# Patient Record
Sex: FEMALE | Race: WHITE | URBAN - METROPOLITAN AREA
[De-identification: names, ages, dates, MRNs, and addresses within clinical notes are randomized per-mention and may not be internally consistent; named-entity substitution may affect disease eponyms.]

---

## 2017-05-28 ENCOUNTER — APPOINTMENT (OUTPATIENT)
Dept: GENERAL RADIOLOGY | Facility: CLINIC | Age: 62
End: 2017-05-28
Attending: EMERGENCY MEDICINE
Payer: COMMERCIAL

## 2017-05-28 ENCOUNTER — APPOINTMENT (OUTPATIENT)
Dept: CT IMAGING | Facility: CLINIC | Age: 62
End: 2017-05-28
Attending: EMERGENCY MEDICINE
Payer: COMMERCIAL

## 2017-05-28 ENCOUNTER — TRANSFERRED RECORDS (OUTPATIENT)
Dept: HEALTH INFORMATION MANAGEMENT | Facility: CLINIC | Age: 62
End: 2017-05-28

## 2017-05-28 ENCOUNTER — HOSPITAL ENCOUNTER (EMERGENCY)
Facility: CLINIC | Age: 62
Discharge: HOME | End: 2017-05-28
Attending: EMERGENCY MEDICINE | Admitting: EMERGENCY MEDICINE
Payer: COMMERCIAL

## 2017-05-28 VITALS
HEIGHT: 63 IN | RESPIRATION RATE: 16 BRPM | WEIGHT: 200 LBS | BODY MASS INDEX: 35.44 KG/M2 | OXYGEN SATURATION: 96 % | TEMPERATURE: 98.3 F | DIASTOLIC BLOOD PRESSURE: 81 MMHG | SYSTOLIC BLOOD PRESSURE: 139 MMHG | HEART RATE: 79 BPM

## 2017-05-28 DIAGNOSIS — R07.89 ATYPICAL CHEST PAIN: ICD-10-CM

## 2017-05-28 DIAGNOSIS — R10.9 ACUTE ABDOMINAL PAIN: ICD-10-CM

## 2017-05-28 LAB
ALBUMIN SERPL-MCNC: 4 G/DL (ref 3.4–5)
ALP SERPL-CCNC: 77 U/L (ref 40–150)
ALT SERPL W P-5'-P-CCNC: 27 U/L (ref 0–50)
ANION GAP SERPL CALCULATED.3IONS-SCNC: 6 MMOL/L (ref 3–14)
AST SERPL W P-5'-P-CCNC: 19 U/L (ref 0–45)
BASOPHILS # BLD AUTO: 0.1 10E9/L (ref 0–0.2)
BASOPHILS NFR BLD AUTO: 0.7 %
BILIRUB SERPL-MCNC: 0.6 MG/DL (ref 0.2–1.3)
BUN SERPL-MCNC: 13 MG/DL (ref 7–30)
CALCIUM SERPL-MCNC: 10 MG/DL (ref 8.5–10.1)
CHLORIDE SERPL-SCNC: 100 MMOL/L (ref 94–109)
CO2 SERPL-SCNC: 29 MMOL/L (ref 20–32)
CREAT SERPL-MCNC: 0.83 MG/DL (ref 0.52–1.04)
D DIMER PPP FEU-MCNC: 0.5 UG/ML FEU (ref 0–0.5)
DIFFERENTIAL METHOD BLD: ABNORMAL
EOSINOPHIL # BLD AUTO: 0 10E9/L (ref 0–0.7)
EOSINOPHIL NFR BLD AUTO: 0.2 %
ERYTHROCYTE [DISTWIDTH] IN BLOOD BY AUTOMATED COUNT: 15.9 % (ref 10–15)
GFR SERPL CREATININE-BSD FRML MDRD: 70 ML/MIN/1.7M2
GLUCOSE SERPL-MCNC: 145 MG/DL (ref 70–99)
HCT VFR BLD AUTO: 47.4 % (ref 35–47)
HGB BLD-MCNC: 15.4 G/DL (ref 11.7–15.7)
IMM GRANULOCYTES # BLD: 0.1 10E9/L (ref 0–0.4)
IMM GRANULOCYTES NFR BLD: 0.4 %
LIPASE SERPL-CCNC: 137 U/L (ref 73–393)
LYMPHOCYTES # BLD AUTO: 2.1 10E9/L (ref 0.8–5.3)
LYMPHOCYTES NFR BLD AUTO: 17.4 %
MCH RBC QN AUTO: 29.8 PG (ref 26.5–33)
MCHC RBC AUTO-ENTMCNC: 32.5 G/DL (ref 31.5–36.5)
MCV RBC AUTO: 92 FL (ref 78–100)
MONOCYTES # BLD AUTO: 0.4 10E9/L (ref 0–1.3)
MONOCYTES NFR BLD AUTO: 3.6 %
NEUTROPHILS # BLD AUTO: 9.4 10E9/L (ref 1.6–8.3)
NEUTROPHILS NFR BLD AUTO: 77.7 %
NRBC # BLD AUTO: 0 10*3/UL
NRBC BLD AUTO-RTO: 0 /100
PLATELET # BLD AUTO: 279 10E9/L (ref 150–450)
POTASSIUM SERPL-SCNC: 3.8 MMOL/L (ref 3.4–5.3)
PROT SERPL-MCNC: 8.5 G/DL (ref 6.8–8.8)
RBC # BLD AUTO: 5.16 10E12/L (ref 3.8–5.2)
SODIUM SERPL-SCNC: 135 MMOL/L (ref 133–144)
TROPONIN I BLD-MCNC: 0 UG/L (ref 0–0.1)
TROPONIN I SERPL-MCNC: NORMAL UG/L (ref 0–0.04)
WBC # BLD AUTO: 12.1 10E9/L (ref 4–11)

## 2017-05-28 PROCEDURE — 74177 CT ABD & PELVIS W/CONTRAST: CPT

## 2017-05-28 PROCEDURE — 93005 ELECTROCARDIOGRAM TRACING: CPT

## 2017-05-28 PROCEDURE — 25000128 H RX IP 250 OP 636: Performed by: EMERGENCY MEDICINE

## 2017-05-28 PROCEDURE — 84484 ASSAY OF TROPONIN QUANT: CPT | Performed by: EMERGENCY MEDICINE

## 2017-05-28 PROCEDURE — 83690 ASSAY OF LIPASE: CPT | Performed by: EMERGENCY MEDICINE

## 2017-05-28 PROCEDURE — 84484 ASSAY OF TROPONIN QUANT: CPT

## 2017-05-28 PROCEDURE — 25000132 ZZH RX MED GY IP 250 OP 250 PS 637: Performed by: EMERGENCY MEDICINE

## 2017-05-28 PROCEDURE — 71020 XR CHEST 2 VW: CPT

## 2017-05-28 PROCEDURE — 80053 COMPREHEN METABOLIC PANEL: CPT | Performed by: EMERGENCY MEDICINE

## 2017-05-28 PROCEDURE — 96375 TX/PRO/DX INJ NEW DRUG ADDON: CPT

## 2017-05-28 PROCEDURE — 85025 COMPLETE CBC W/AUTO DIFF WBC: CPT | Performed by: EMERGENCY MEDICINE

## 2017-05-28 PROCEDURE — 96376 TX/PRO/DX INJ SAME DRUG ADON: CPT

## 2017-05-28 PROCEDURE — 96361 HYDRATE IV INFUSION ADD-ON: CPT

## 2017-05-28 PROCEDURE — 25000125 ZZHC RX 250: Performed by: EMERGENCY MEDICINE

## 2017-05-28 PROCEDURE — 85379 FIBRIN DEGRADATION QUANT: CPT | Performed by: EMERGENCY MEDICINE

## 2017-05-28 PROCEDURE — 96374 THER/PROPH/DIAG INJ IV PUSH: CPT | Mod: 59

## 2017-05-28 PROCEDURE — 93005 ELECTROCARDIOGRAM TRACING: CPT | Mod: 76

## 2017-05-28 PROCEDURE — 99285 EMERGENCY DEPT VISIT HI MDM: CPT | Mod: 25

## 2017-05-28 PROCEDURE — S0028 INJECTION, FAMOTIDINE, 20 MG: HCPCS | Performed by: EMERGENCY MEDICINE

## 2017-05-28 RX ORDER — ONDANSETRON 4 MG/1
4 TABLET, ORALLY DISINTEGRATING ORAL EVERY 8 HOURS PRN
Qty: 10 TABLET | Refills: 0 | Status: SHIPPED | OUTPATIENT
Start: 2017-05-28 | End: 2017-05-31

## 2017-05-28 RX ORDER — ONDANSETRON 2 MG/ML
4 INJECTION INTRAMUSCULAR; INTRAVENOUS EVERY 30 MIN PRN
Status: DISCONTINUED | OUTPATIENT
Start: 2017-05-28 | End: 2017-05-29 | Stop reason: HOSPADM

## 2017-05-28 RX ORDER — HYDROMORPHONE HYDROCHLORIDE 1 MG/ML
0.5 INJECTION, SOLUTION INTRAMUSCULAR; INTRAVENOUS; SUBCUTANEOUS
Status: DISCONTINUED | OUTPATIENT
Start: 2017-05-28 | End: 2017-05-29 | Stop reason: HOSPADM

## 2017-05-28 RX ORDER — ALUMINA, MAGNESIA, AND SIMETHICONE 2400; 2400; 240 MG/30ML; MG/30ML; MG/30ML
15 SUSPENSION ORAL ONCE
Status: COMPLETED | OUTPATIENT
Start: 2017-05-28 | End: 2017-05-28

## 2017-05-28 RX ORDER — ASPIRIN 81 MG/1
324 TABLET, CHEWABLE ORAL ONCE
Status: COMPLETED | OUTPATIENT
Start: 2017-05-28 | End: 2017-05-28

## 2017-05-28 RX ORDER — LIDOCAINE 40 MG/G
CREAM TOPICAL
Status: DISCONTINUED | OUTPATIENT
Start: 2017-05-28 | End: 2017-05-29 | Stop reason: HOSPADM

## 2017-05-28 RX ORDER — IOPAMIDOL 755 MG/ML
500 INJECTION, SOLUTION INTRAVASCULAR ONCE
Status: COMPLETED | OUTPATIENT
Start: 2017-05-28 | End: 2017-05-28

## 2017-05-28 RX ORDER — SODIUM CHLORIDE 9 MG/ML
1000 INJECTION, SOLUTION INTRAVENOUS CONTINUOUS
Status: DISCONTINUED | OUTPATIENT
Start: 2017-05-28 | End: 2017-05-29 | Stop reason: HOSPADM

## 2017-05-28 RX ADMIN — Medication 0.5 MG: at 21:29

## 2017-05-28 RX ADMIN — SODIUM CHLORIDE 1000 ML: 9 INJECTION, SOLUTION INTRAVENOUS at 18:08

## 2017-05-28 RX ADMIN — IOPAMIDOL 100 ML: 755 INJECTION, SOLUTION INTRAVENOUS at 20:51

## 2017-05-28 RX ADMIN — SODIUM CHLORIDE 66 ML: 9 INJECTION, SOLUTION INTRAVENOUS at 20:51

## 2017-05-28 RX ADMIN — ASPIRIN 81 MG CHEWABLE TABLET 324 MG: 81 TABLET CHEWABLE at 18:08

## 2017-05-28 RX ADMIN — FAMOTIDINE 20 MG: 10 INJECTION, SOLUTION INTRAVENOUS at 20:21

## 2017-05-28 RX ADMIN — LIDOCAINE HYDROCHLORIDE 15 ML: 20 SOLUTION ORAL; TOPICAL at 19:32

## 2017-05-28 RX ADMIN — ONDANSETRON 4 MG: 2 INJECTION INTRAMUSCULAR; INTRAVENOUS at 18:10

## 2017-05-28 RX ADMIN — Medication 0.5 MG: at 20:21

## 2017-05-28 RX ADMIN — ALUMINUM HYDROXIDE, MAGNESIUM HYDROXIDE, AND DIMETHICONE 15 ML: 400; 400; 40 SUSPENSION ORAL at 19:32

## 2017-05-28 NOTE — ED NOTES
Patient sent from urgent care for evaluation of chest pain with shortness of breath and nausea/vomiting onset today. Reports mid upper back pain as well for the past few days.

## 2017-05-28 NOTE — ED AVS SNAPSHOT
[facility] Emergency Department    [address]    [facility] [address]    Phone:  [phone]    Fax:  [phone]                                       Ximena Mora   MRN: [id number]    Department:  [facility] Emergency Department   Date of Visit:  5/28/2017           After Visit Summary Signature Page     I have received my discharge instructions, and my questions have been answered. I have discussed any challenges I see with this plan with the nurse or doctor.    ..........................................................................................................................................  Patient/Patient Representative Signature      ..........................................................................................................................................  Patient Representative Print Name and Relationship to Patient    ..................................................               ................................................  Date                                            Time    ..........................................................................................................................................  Reviewed by Signature/Title    ...................................................              ..............................................  Date                                                            Time

## 2017-05-28 NOTE — ED AVS SNAPSHOT
[facility] Emergency Department    [address] [place] [address]    Phone:  [phone]    Fax:  [phone]                                       Ximena Mora   MRN: [id number]    Department:  [facility] Emergency Department   Date of Visit:  5/28/2017           Patient Information     Date Of Birth          1955        Your diagnoses for this visit were:     Atypical chest pain     Acute abdominal pain        You were seen by Rafael Arredondo MD.      Follow-up Information     Follow up with Mary Swanson. Call in 2 days.    Contact information:    [facility]  [address] [place] [address]  [phone]          Discharge Instructions       Discharge Instructions  Chest Pain    You have been seen today for chest pain or discomfort.  At this time, your doctor has found no signs that your chest pain is due to a serious or life-threatening condition, (or you have declined more testing and/or admission to the hospital). However, sometimes there is a serious problem that does not show up right away. Your evaluation today may not be complete and you may need further testing and evaluation.     You need to follow-up with your regular doctor within 3 days.    Return to the Emergency Department if:    Your chest pain changes, gets worse, starts to happen more often, or comes with less activity.    You are short of breath.    You get very weak or tired.    You pass out or faint.    You have any new symptoms, like fever, cough, numb legs, or you cough up blood.    You have anything else that worries you.    Until you follow-up with your regular doctor please do the following:    Take one aspirin daily unless you have an allergy or are told not to by your doctor.    If a stress test appointment has been made, go to the appointment.    If you have questions, contact your regular doctor.    If your doctor today has told you to follow-up with your  regular doctor, it is very important that you make an appointment with your clinic and go to the appointment.  If you do not follow-up with your primary doctor, it may result in missing an important development which could result in permanent injury or disability and/or lasting pain.  If there is any problem keeping your appointment, call your doctor or return to the Emergency Department.    If you were given a prescription for medicine here today, be sure to read all of the information (including the package insert) that comes with your prescription.  This will include important information about the medicine, its side effects, and any warnings that you need to know about.  The pharmacist who fills the prescription can provide more information and answer questions you may have about the medicine.  If you have questions or concerns that the pharmacist cannot address, please call or return to the Emergency Department.     Opioid Medication Information    Pain medications are among the most commonly prescribed medicines, so we are including this information for all our patients. If you did not receive pain medication or get a prescription for pain medicine, you can ignore it.     You may have been given a prescription for an opioid (narcotic) pain medicine and/or have received a pain medicine while here in the Emergency Department. These medicines can make you drowsy or impaired. You must not drive, operate dangerous equipment, or engage in any other dangerous activities while taking these medications. If you drive while taking these medications, you could be arrested for DUI, or driving under the influence. Do not drink any alcohol while you are taking these medications.     Opioid pain medications can cause addiction. If you have a history of chemical dependency of any type, you are at a higher risk of becoming addicted to pain medications.  Only take these prescribed medications to treat your pain when all other  options have been tried. Take it for as short a time and as few doses as possible. Store your pain pills in a secure place, as they are frequently stolen and provide a dangerous opportunity for children or visitors in your house to start abusing these powerful medications. We will not replace any lost or stolen medicine.  As soon as your pain is better, you should flush all your remaining medication.     Many prescription pain medications contain Tylenol  (acetaminophen), including Vicodin , Tylenol #3 , Norco , Lortab , and Percocet .  You should not take any extra pills of Tylenol  if you are using these prescription medications or you can get very sick.  Do not ever take more than 3000 mg of acetaminophen in any 24 hour period.    All opioids tend to cause constipation. Drink plenty of water and eat foods that have a lot of fiber, such as fruits, vegetables, prune juice, apple juice and high fiber cereal.  Take a laxative if you don t move your bowels at least every other day. Miralax , Milk of Magnesia, Colace , or Senna  can be used to keep you regular.      Remember that you can always come back to the Emergency Department if you are not able to see your regular doctor in the amount of time listed above, if you get any new symptoms, or if there is anything that worries you.      Discharge Instructions  Abdominal Pain    Abdominal pain can be caused by many things. Your evaluation today does not show the exact cause for your pain. Your doctor today has decided that it is unlikely your pain is due to a life threatening problem, or a problem requiring surgery or hospital admission. Sometimes those problems cannot be found right away, so it is very important that you follow up as directed.  Sometimes only the changes which occur over time allow the cause of your pain to be found.    Return to the Emergency Department for a recheck in 8-12 hours if your pain continues.  If your pain gets worse, changes in location,  or feels different, return to the Emergency Department right away.    ADULTS:  Return to the Emergency Department right away if:      You get an oral temperature above 102oF or as directed by your doctor.    You have blood in your stools (bright red or black, tarry stools).    You keep throwing up or can t drink liquids.    You see blood when you throw up.    You can t have a bowel movement or you can t pass gas.    Your stomach gets bloated or bigger.    Your skin or the whites of your eyes look yellow.    You faint.    You have bloody, frequent or painful urination.    You have new symptoms or anything that worries you.    CHILDREN:  Return to the Emergency Department right away if your child has any of the above-listed symptoms or the following:      Pushes your hand away or screams/cries when his/her belly is touched.    You notice your child is very fussy or weak.    Your child is very tired and is too tired to eat or drink.    Your child is dehydrated.  Signs of dehydration can be:  o Your infant has had no wet diapers in 4-5 hours.  o Your older child has not passed urine in 6-8 hours.  o Your infant or child starts to have dry mouth and lips, or no saliva or tears.    PREGNANT WOMEN:  Return to the Emergency Department right away if you have any of the above-listed symptoms or the following:      You have bleeding, leaking fluid or passing tissue from the vagina.    You have worse pain or cramping, or pain in your shoulder or back.    You have vomiting that will not stop.    You have painful or bloody urination.    You have a temperature of 100oF or more.    Your baby is not moving as much as usual.    You faint.    You get a bad headache with or without eye problems and abdominal pain.    You have a convulsion or seizure.    You have unusual discharge from your vagina and abdominal pain.    Abdominal pain is pretty common during pregnancy.  Your pain may or may not be related to your pregnancy. You should  "follow-up closely with your OB doctor so they can evaluate you and your baby.  Until you follow-up with your regular doctor, do the following:       Avoid sex and do not put anything in your vagina.    Drink clear fluids.    Only take medications approved by your doctor.    MORE INFORMATION:    Appendicitis:  A possible cause of abdominal pain in any person who still has their appendix is acute appendicitis. Appendicitis is often hard to diagnose.  Testing does not always rule out early appendicitis or other causes of abdominal pain. Close follow-up with your doctor and re-evaluations may be needed to figure out the reason for your abdominal pain.    Follow-up:  It is very important that you make an appointment with your clinic and go to the appointment.  If you do not follow-up with your primary doctor, it may result in missing an important development which could result in permanent injury or disability and/or lasting pain.  If there is any problem keeping your appointment, call your doctor or return to the Emergency Department.    Medications:  Take your medications as directed by your doctor today.  Before using over-the-counter medications, ask your doctor and make sure to take the medications as directed.  If you have any questions about medications, ask your doctor.    Diet:  Resume your normal diet as much as possible, but do not eat fried, fatty or spicy foods while you have pain.  Do not drink alcohol or have caffeine.  Do not smoke tobacco.    Probiotics: If you have been given an antibiotic, you may want to also take a probiotic pill or eat yogurt with live cultures. Probiotics have \"good bacteria\" to help your intestines stay healthy. Studies have shown that probiotics help prevent diarrhea and other intestine problems (including C. diff infection) when you take antibiotics. You can buy these without a prescription in the pharmacy section of the store.     If you were given a prescription for medicine " here today, be sure to read all of the information (including the package insert) that comes with your prescription.  This will include important information about the medicine, its side effects, and any warnings that you need to know about.  The pharmacist who fills the prescription can provide more information and answer questions you may have about the medicine.  If you have questions or concerns that the pharmacist cannot address, please call or return to the Emergency Department.         Opioid Medication Information    Pain medications are among the most commonly prescribed medicines, so we are including this information for all our patients. If you did not receive pain medication or get a prescription for pain medicine, you can ignore it.     You may have been given a prescription for an opioid (narcotic) pain medicine and/or have received a pain medicine while here in the Emergency Department. These medicines can make you drowsy or impaired. You must not drive, operate dangerous equipment, or engage in any other dangerous activities while taking these medications. If you drive while taking these medications, you could be arrested for DUI, or driving under the influence. Do not drink any alcohol while you are taking these medications.     Opioid pain medications can cause addiction. If you have a history of chemical dependency of any type, you are at a higher risk of becoming addicted to pain medications.  Only take these prescribed medications to treat your pain when all other options have been tried. Take it for as short a time and as few doses as possible. Store your pain pills in a secure place, as they are frequently stolen and provide a dangerous opportunity for children or visitors in your house to start abusing these powerful medications. We will not replace any lost or stolen medicine.  As soon as your pain is better, you should flush all your remaining medication.     Many prescription pain  medications contain Tylenol  (acetaminophen), including Vicodin , Tylenol #3 , Norco , Lortab , and Percocet .  You should not take any extra pills of Tylenol  if you are using these prescription medications or you can get very sick.  Do not ever take more than 3000 mg of acetaminophen in any 24 hour period.    All opioids tend to cause constipation. Drink plenty of water and eat foods that have a lot of fiber, such as fruits, vegetables, prune juice, apple juice and high fiber cereal.  Take a laxative if you don t move your bowels at least every other day. Miralax , Milk of Magnesia, Colace , or Senna  can be used to keep you regular.      Remember that you can always come back to the Emergency Department if you are not able to see your regular doctor in the amount of time listed above, if you get any new symptoms, or if there is anything that worries you.          24 Hour Appointment Hotline       To make an appointment at any [facility], call [phone] ([phone]). If you don't have a family doctor or clinic, we will help you find one. [facility] are conveniently located to serve the needs of you and your family.             Review of your medicines      START taking        Dose / Directions Last dose taken    ondansetron 4 MG ODT tab   Commonly known as:  ZOFRAN ODT   Dose:  4 mg   Quantity:  10 tablet        Take 1 tablet (4 mg) by mouth every 8 hours as needed for nausea   Refills:  0          Our records show that you are taking the medicines listed below. If these are incorrect, please call your family doctor or clinic.        Dose / Directions Last dose taken    aspirin 81 MG EC tablet   Dose:  81 mg        Take 81 mg by mouth daily   Refills:  0        CYMBALTA 60 MG EC capsule   Dose:  120 mg   Generic drug:  DULoxetine        Take 120 mg by mouth daily   Refills:  0        fish oil-omega-3 fatty acids 1000 MG capsule   Dose:  2 g        Take 2 g by mouth daily.   Refills:  0         folic acid 1 MG tablet   Commonly known as:  FOLVITE   Dose:  1 mg        Take 1 mg by mouth daily.   Refills:  0        ibuprofen 600 MG tablet   Commonly known as:  ADVIL/MOTRIN   Dose:  600 mg   Quantity:  30 tablet        Take 1 tablet by mouth every 6 hours as needed for other (For mild pain and temperature greater than 102F).   Refills:  0        levothyroxine 150 MCG tablet   Commonly known as:  SYNTHROID/LEVOTHROID   Dose:  150 mcg        Take 150 mcg by mouth daily.   Refills:  0        lisinopril 5 MG tablet   Commonly known as:  PRINIVIL/ZESTRIL   Dose:  5 mg        Take 5 mg by mouth daily.   Refills:  0        metFORMIN 500 MG 24 hr tablet   Commonly known as:  GLUCOPHAGE-XR   Dose:  1000 mg        Take 1,000 mg by mouth 2 times daily (with meals)   Refills:  0        METHOTREXATE PO        Refills:  0        order for DME   Quantity:  1 each        Equipment being ordered: Cane () Treatment Diagnosis: Bethany impairment   Refills:  0        pravastatin 40 MG tablet   Commonly known as:  PRAVACHOL   Dose:  40 mg        Take 40 mg by mouth daily.   Refills:  0        PROTONIX 40 MG EC tablet   Dose:  40 mg   Indication:  Gastroesophageal Reflux Disease   Generic drug:  pantoprazole        Take 40 mg by mouth daily. Indications: Gastroesophageal Reflux Disease   Refills:  0        traMADol 50 MG tablet   Commonly known as:  ULTRAM   Dose:  50 mg        Take 50 mg by mouth 2 times daily as needed   Refills:  0        vitamin D 2000 UNITS Caps   Dose:  1 capsule        Take 1 capsule by mouth daily.   Refills:  0                Prescriptions were sent or printed at these locations (1 Prescription)                   Other Prescriptions                Printed at Department/Unit printer (1 of 1)         ondansetron (ZOFRAN ODT) 4 MG ODT tab                Procedures and tests performed during your visit     Procedure/Test Number of Times Performed    CBC with platelets differential 1    CT Abdomen Pelvis w  Contrast 1    Cardiac Continuous Monitoring 1    Chest XR,  PA & LAT 1    Comprehensive metabolic panel 1    D dimer quantitative 1    EKG 12 lead 2    Lipase 1    Peripheral IV: Standard 1    Pulse oximetry nursing 1    Troponin I 1    Troponin POCT 1      Orders Needing Specimen Collection     None      Pending Results     Date and Time Order Name Status Description    5/28/2017 1742 EKG 12 lead Preliminary             Pending Culture Results     No orders found from 5/26/2017 to 5/29/2017.            Pending Results Instructions     If you had any lab results that were not finalized at the time of your Discharge, you can call the ED Lab Result RN at [phone]. You will be contacted by this team for any positive Lab results or changes in treatment. The nurses are available 7 days a week from 10A to 6:30P.  You can leave a message 24 hours per day and they will return your call.        Test Results From Your Hospital Stay        5/28/2017  6:17 PM      Component Results     Component Value Ref Range & Units Status    WBC 12.1 (H) 4.0 - 11.0 10e9/L Final    RBC Count 5.16 3.8 - 5.2 10e12/L Final    Hemoglobin 15.4 11.7 - 15.7 g/dL Final    Hematocrit 47.4 (H) 35.0 - 47.0 % Final    MCV 92 78 - 100 fl Final    MCH 29.8 26.5 - 33.0 pg Final    MCHC 32.5 31.5 - 36.5 g/dL Final    RDW 15.9 (H) 10.0 - 15.0 % Final    Platelet Count 279 150 - 450 10e9/L Final    Diff Method Automated Method  Final    % Neutrophils 77.7 % Final    % Lymphocytes 17.4 % Final    % Monocytes 3.6 % Final    % Eosinophils 0.2 % Final    % Basophils 0.7 % Final    % Immature Granulocytes 0.4 % Final    Nucleated RBCs 0 0 /100 Final    Absolute Neutrophil 9.4 (H) 1.6 - 8.3 10e9/L Final    Absolute Lymphocytes 2.1 0.8 - 5.3 10e9/L Final    Absolute Monocytes 0.4 0.0 - 1.3 10e9/L Final    Absolute Eosinophils 0.0 0.0 - 0.7 10e9/L Final    Absolute Basophils 0.1 0.0 - 0.2 10e9/L Final    Abs Immature Granulocytes 0.1 0 - 0.4 10e9/L Final     Absolute Nucleated RBC 0.0  Final         5/28/2017  6:33 PM      Component Results     Component Value Ref Range & Units Status    Troponin I ES  0.000 - 0.045 ug/L Final    <0.015  The 99th percentile for upper reference range is 0.045 ug/L.  Troponin values in   the range of 0.045 - 0.120 ug/L may be associated with risks of adverse   clinical events.           5/28/2017  6:33 PM      Component Results     Component Value Ref Range & Units Status    Sodium 135 133 - 144 mmol/L Final    Potassium 3.8 3.4 - 5.3 mmol/L Final    Chloride 100 94 - 109 mmol/L Final    Carbon Dioxide 29 20 - 32 mmol/L Final    Anion Gap 6 3 - 14 mmol/L Final    Glucose 145 (H) 70 - 99 mg/dL Final    Urea Nitrogen 13 7 - 30 mg/dL Final    Creatinine 0.83 0.52 - 1.04 mg/dL Final    GFR Estimate 70 >60 mL/min/1.7m2 Final    Non  GFR Calc    GFR Estimate If Black 85 >60 mL/min/1.7m2 Final    African American GFR Calc    Calcium 10.0 8.5 - 10.1 mg/dL Final    Bilirubin Total 0.6 0.2 - 1.3 mg/dL Final    Albumin 4.0 3.4 - 5.0 g/dL Final    Protein Total 8.5 6.8 - 8.8 g/dL Final    Alkaline Phosphatase 77 40 - 150 U/L Final    ALT 27 0 - 50 U/L Final    AST 19 0 - 45 U/L Final         5/28/2017  6:25 PM      Component Results     Component Value Ref Range & Units Status    D Dimer 0.5 0.0 - 0.50 ug/ml FEU Final    This D-dimer assay is intended for use in conjuntion with a clinical pretest   probability assessment model to exclude pulmonary embolism (PE) and as an aid   in the diagnosis of deep venous thrombosis (DVT) in outpatients suspected of   PE   or DVT. The cut-off value is 0.5 g/mL FEU.           5/28/2017  6:33 PM      Component Results     Component Value Ref Range & Units Status    Lipase 137 73 - 393 U/L Final         5/28/2017  8:01 PM      Narrative     CHEST TWO VIEWS    5/28/2017 6:47 PM     HISTORY: Chest pain.    COMPARISON: Chest x-rays dated 2/6/2015.    FINDINGS:  Anterior-inferior cervical spinal fusion  hardware is again  noted. Cardiac silhouette appears mildly enlarged and has slightly  increased in size since the prior study. Lungs are grossly clear.  Mediastinum and pulmonary vascularity are within normal limits. No  pneumothorax or significant pleural fluid collection. No fracture.        Impression     IMPRESSION:   1. Cardiac silhouette appears mildly enlarged and slightly increased  in size since the prior study. This apparent slight increase in size  could be due to hypoaeration falsely increasing the size of the  cardiac silhouette.  2. No other evidence of acute cardiopulmonary disease is seen.     ELIZABETH BURNETTE MD         5/28/2017  9:32 PM      Narrative     CT ABDOMEN PELVIS WITH CONTRAST  5/28/2017 8:58 PM    HISTORY: Upper abdominal pain. Chest pain with shortness of breath and  nausea/vomiting.    TECHNIQUE: Scans obtained from the diaphragm through the pelvis with  oral and IV contrast, 100 mL Isovue-370.   Radiation dose for this scan was reduced using automated exposure  control, adjustment of the mA and/or kV according to patient size, or  iterative reconstruction technique.    COMPARISON:  None.    FINDINGS: Visualized portions of the lung bases and mediastinal  contents are unremarkable.  Degenerative changes are seen in the  spine. Levoconvex broad-based curvature lumbar spine is noted. No  aggressive osseous lesions are seen.      Surgical clip in the gallbladder fossa and absence of the gallbladder  most consistent with prior surgical cholecystectomy. There is diffuse  fatty infiltration of the liver. Cyst in the superior pole of the  right kidney measures up to 2.9 cm in diameter. The liver, pancreas,  spleen, bilateral adrenal glands, and bilateral kidneys otherwise  enhance normally. No hydronephrosis, nephrolithiasis, hydroureter or  ureteral calculus is identified. Urinary bladder is grossly normal in  appearance.    The uterus and structure likely representing ovaries are  grossly  within normal limits. No adenopathy, free fluid, or free air is seen  in the peritoneal cavity. There is calcific nonaneurysmal  atherosclerosis.    The colon is grossly of normal caliber. There are scattered  diverticula in the colon. No pericolonic inflammatory change to  suggest acute diverticulitis. Appendix is not well seen. No pericecal  inflammatory change to suggest acute appendicitis.    There is distention of loop of proximal small bowel (third portion of  the duodenum) measuring up to 2.9 cm in diameter. This is of uncertain  etiology and clinical significance. The small bowel is otherwise of  normal caliber. Stomach is partially distended with fluid but is  otherwise unremarkable.        Impression     IMPRESSION:  1. Fluid-filled distention of the duodenum of uncertain clinical  significance and etiology.  2. Diffuse fatty infiltration of liver.  3. Probable prior cholecystectomy. This history was not given.  4. Nonaneurysmal atherosclerosis.  5. No other significant abnormalities are identified. Etiology for  patient's symptoms is otherwise not seen.  6. Colonic diverticulosis without evidence for acute diverticulitis.  7. Moderate to severe degenerative changes of the lumbar spine.     ELIZABETH BURNETTE MD         5/28/2017  9:51 PM      Component Results     Component Value Ref Range & Units Status    Troponin I 0.00 0.00 - 0.10 ug/L Final                Clinical Quality Measure: Blood Pressure Screening     Your blood pressure was checked while you were in the emergency department today. The last reading we obtained was  BP: (!) 139/91 . Please read the guidelines below about what these numbers mean and what you should do about them.  If your systolic blood pressure (the top number) is less than 120 and your diastolic blood pressure (the bottom number) is less than 80, then your blood pressure is normal. There is nothing more that you need to do about it.  If your systolic blood pressure (the  "top number) is 120-139 or your diastolic blood pressure (the bottom number) is 80-89, your blood pressure may be higher than it should be. You should have your blood pressure rechecked within a year by a primary care provider.  If your systolic blood pressure (the top number) is 140 or greater or your diastolic blood pressure (the bottom number) is 90 or greater, you may have high blood pressure. High blood pressure is treatable, but if left untreated over time it can put you at risk for heart attack, stroke, or kidney failure. You should have your blood pressure rechecked by a primary care provider within the next 4 weeks.  If your provider in the emergency department today gave you specific instructions to follow-up with your doctor or provider even sooner than that, you should follow that instruction and not wait for up to 4 weeks for your follow-up visit.        Thank you for choosing [facility]       Thank you for choosing [facility] for your care. Our goal is always to provide you with excellent care. Hearing back from our patients is one way we can continue to improve our services. Please take a few minutes to complete the written survey that you may receive in the mail after you visit with us. Thank you!        [employer] Information     [employer] lets you send messages to your doctor, view your test results, renew your prescriptions, schedule appointments and more. To sign up, go to [email or web address] . Click on \"Log in\" on the left side of the screen, which will take you to the Welcome page. Then click on \"Sign up Now\" on the right side of the page.     You will be asked to enter the access code listed below, as well as some personal information. Please follow the directions to create your username and password.     Your access code is: [email or web address]  Expires: 2017  9:51 PM     Your access code will  in 90 days. If you need help or a new code, please call your [facility] clinic or [phone].      "   Care EveryWhere ID     This is your Care EveryWhere ID. This could be used by other organizations to access your [facility] medical records  [id number]        After Visit Summary       This is your record. Keep this with you and show to your community pharmacist(s) and doctor(s) at your next visit.

## 2017-05-29 LAB
INTERPRETATION ECG - MUSE: NORMAL
INTERPRETATION ECG - MUSE: NORMAL

## 2017-05-29 NOTE — ED PROVIDER NOTES
CHIEF COMPLAINT:  Chest pain, upper abdominal pain.        HISTORY OF PRESENT ILLNESS:  Ximena Mora is a 61-year-old female that came in complaining of chest pain and upper abdominal pain that has been ongoing since earlier today.  The patient took her medications this morning at around 10:00 a.m., started complaining of epigastric and chest pain; described it as achy pressure sensation with worsening belching and shortness of breath, pleuritic pain worsened with deep breath, movement and with swallowing.  She had no difficulty swallowing, though nausea noted, but no vomiting, no shortness of breath.  Pain is currently moderate in sensation, constant without relief.      ALLERGIES:  Prilosec.        MEDICATION:  Protonix, Advil, Percocet, methotrexate, Synthroid, Prinivil, Cymbalta, Pravachol, Folvite, Ultram, vitamin D, Enbrel.      PAST MEDICAL HISTORY:  Hypertension, numbness and tingling, GERD, diabetes mellitus, hypothyroid, rheumatoid arthritis, chronic pain, esophageal strictures, high cholesterol.      SOCIAL HISTORY:  The patient smokes 1 pack per day, does not use alcohol.      REVIEW OF SYSTEMS:     CONSTITUTION:  Negative for fevers, chills.   CARDIAC:  Positive for chest pain.   RESPIRATORY:  Positive for shortness of breath.     GASTROINTESTINAL:  Positive for abdominal pain.     All other review of systems is negative.      PHYSICAL EXAMINATION:   VITAL SIGNS:  Temperature 98.3, pulse 68, respiratory rate 18, blood pressure 170/111, pulse ox 96% on room air.   IN GENERAL:  The patient is in minor distress secondary due to pain.     ORAL:  Moist mucous membrane.   NECK:  Supple.   HEART:  S1, S2, regular rate and rhythm, no murmurs, rubs or gallops.   LUNGS:  Clear to auscultation bilaterally.  No wheezing, rales or rhonchi.   ABDOMEN:  Bowel sounds are positive, soft.  She has mild epigastric and right upper quadrant tenderness noted.  No guarding or rebound, no pain on percussion of her flanks.    MUSCULOSKELETAL:  2+ distal pulses.  No leg calf swelling, tenderness or edema.   NEUROLOGIC:  The patient is alert and oriented x3, moves all 4 extremities spontaneously.     DERM:  Non-pallor.      CT abdomen and pelvis with contrast.  Impression:   1.  Fluid-filled distention of the duodenum of uncertain clinical significance and etiology.   2.  Diffuse fatty infiltration of the liver.   3.  Probable prior cholecystectomy, though history was not given.    4.  Nonaneurysmal atherosclerosis.    5.  No other significant abnormalities are identified; the etiology for the patient's symptoms is otherwise not seen.    6.  Colonic diverticulosis without evidence of acute diverticulitis.   7.  Moderate to severe degenerative changes of the lumbar spine.      Chest x-ray:  Impression:   1.  Cardiac silhouette appears mildly enlarges and slightly increased in size since the prior study.  This apparent slight increase in size could be due to hypo-aeration falsely increasing the size of the cardiac silhouette.    2.  No other evidence of acute cardiopulmonary disease is seen.        EKG #1:  Normal sinus rhythm with sinus arrhythmia, nonspecific T-wave abnormality, .  Interpretation:  Abnormal EKG; read at 17:45 by Dr. Rafael Arredondo.    EKG #2:  Normal sinus rhythm with sinus arrhythmia, nonspecific ST abnormal EKG.  QTC is 443.  Interpreted by Rafael Arredondo at 20:34.      First troponin less than 0.015.  Second troponin 0.00.  CBC:  WBC is 12.1, otherwise within normal limits.  CMP is within normal limits.  D-dimer 0.5, lipase 137.      EMERGENCY DEPARTMENT COURSE AND TREATMENT:  The patient was seen by ED physician, ED nurse.  All findings were reviewed.  All questions were answered.  The case discussed with radiologist on-call to discuss further results who disclosed that there were no acute findings to suggest cause for the patient's abdominal pain.  The patient was noted to feeling improved.  The patient was  discharged home.      INTERVENTIONS:    1.  Aspirin 324 mg p.o.   2.  Normal saline 1 liter IV.   3.  GI cocktail 15 mL p.o.    4.  Zofran 4 mg IV.   5.  Dilaudid 0.5 mg IV x2.   6.  Pepcid 20 mg IV.      MEDICAL DECISION MAKING:  This is a 61-year-old female that came in complaining of atypical chest pain and upper abdominal pain.  Differential includes ACS, PE, pneumothorax, costochondritis, small-bowel obstruction, pancreatitis, gastritis, esophagitis or other causes.  She did end up having workup as seen.  EKG and troponin x2 showed no acute ischemic changes.  Labwork was nonspecific.  With continued symptoms and upper abdominal pain, I did end up getting a CT of abdomen and pelvis to evaluate for possible small-bowel obstruction or further surgical processes; that does not appear to be the case.  The symptoms started after swallowing her pills.  She has been burping and having upper abdominal pain since then, although tolerating p.o., no concerns for obstruction.  I am suspicious for possible gastritis/esophagitis, although this remains to be seen.  With improved symptoms and no findings that would suggest for acute ischemia, I do believe that the patient is otherwise safe for discharge.  She will be discharged with a course of Zofran.  She is told to continue with her PPI as well as opiates that she has already previously been prescribed at home and should return for any worsening abdominal pain, chest pain, shortness of breath, nausea, vomiting, or any symptoms or concerns.      DISPOSITION:  Home.  Follow up with PMD.      DIAGNOSES:   1.  Acute atypical chest pain.   2.  Acute abdominal pain, unspecified.         KASIA KERR MD             D: 2017 22:59   T: 2017 14:08   MT: [id number]      Name:     RASHAAD HERMAN   MRN:      [id number]        Account:      [id number]   :      1955           Visit Date:   2017      Document: [id number]